# Patient Record
Sex: MALE | Race: WHITE | NOT HISPANIC OR LATINO
[De-identification: names, ages, dates, MRNs, and addresses within clinical notes are randomized per-mention and may not be internally consistent; named-entity substitution may affect disease eponyms.]

---

## 2021-07-14 ENCOUNTER — TRANSCRIPTION ENCOUNTER (OUTPATIENT)
Age: 31
End: 2021-07-14

## 2021-11-08 ENCOUNTER — APPOINTMENT (OUTPATIENT)
Dept: HUMAN REPRODUCTION | Facility: CLINIC | Age: 31
End: 2021-11-08
Payer: COMMERCIAL

## 2021-11-08 PROCEDURE — 36415 COLL VENOUS BLD VENIPUNCTURE: CPT

## 2023-08-13 ENCOUNTER — NON-APPOINTMENT (OUTPATIENT)
Age: 33
End: 2023-08-13

## 2023-08-17 ENCOUNTER — APPOINTMENT (OUTPATIENT)
Dept: ORTHOPEDIC SURGERY | Facility: CLINIC | Age: 33
End: 2023-08-17
Payer: COMMERCIAL

## 2023-08-17 VITALS — BODY MASS INDEX: 24.87 KG/M2 | HEIGHT: 75 IN | WEIGHT: 200 LBS

## 2023-08-17 PROBLEM — Z00.00 ENCOUNTER FOR PREVENTIVE HEALTH EXAMINATION: Status: ACTIVE | Noted: 2023-08-17

## 2023-08-17 PROCEDURE — 29280 STRAPPING OF HAND OR FINGER: CPT | Mod: RT

## 2023-08-17 PROCEDURE — 99203 OFFICE O/P NEW LOW 30 MIN: CPT | Mod: 25

## 2023-08-17 RX ORDER — ESCITALOPRAM OXALATE 5 MG/1
TABLET, FILM COATED ORAL
Refills: 0 | Status: ACTIVE | COMMUNITY

## 2023-08-17 NOTE — IMAGING
[de-identified] : right hand IF, MF, and RF skin intact no deformity swelling about the  PIP TTP about the PIPj FAROM except mild stiffness of the  PIP NVID

## 2023-08-17 NOTE — ASSESSMENT
[FreeTextEntry1] : The patient was advised of the diagnosis. The natural history of the pathology was explained in full to the patient in layman's terms. We discussed that the patient would be better off with early protected range of motion.  Splinting leads to a high incidence of stiffness.  I recommend jacky strapping(strap placed) and early range of motion exercises x 3weeks.  If motion is near normal the patient can continue with a home exercise program.  If the patient is having a lot of difficulty with self directed exercises, therapy may be recommended.  All questions were answered. The risks and benefits of surgical and non-surgical treatment alternatives were explained in full to the patient.  The affected finger is noted to be clean and dry.  A jacky loop was applied to the affected finger over the middle phalanx. It was then strapped to the adjacent finger in overlapping fashion. Fit and pressure were assessed as the strapping was applied and stopped when the desired amount of support was achieved  F/u in 3 weeks

## 2023-08-17 NOTE — DATA REVIEWED
[Outside X-rays] : outside x-rays [Right] : of the right [Hand] : hand [I independently reviewed and interpreted images and report] : I independently reviewed and interpreted images and report [FreeTextEntry1] : avulsions at PIP of IF, MF, and RF

## 2023-09-11 ENCOUNTER — APPOINTMENT (OUTPATIENT)
Dept: ORTHOPEDIC SURGERY | Facility: CLINIC | Age: 33
End: 2023-09-11
Payer: COMMERCIAL

## 2023-09-11 DIAGNOSIS — S63.632A SPRAIN OF INTERPHALANGEAL JOINT OF RIGHT MIDDLE FINGER, INITIAL ENCOUNTER: ICD-10-CM

## 2023-09-11 DIAGNOSIS — S63.634A SPRAIN OF INTERPHALANGEAL JOINT OF RIGHT RING FINGER, INITIAL ENCOUNTER: ICD-10-CM

## 2023-09-11 DIAGNOSIS — S63.630A SPRAIN OF INTERPHALANGEAL JOINT OF RIGHT INDEX FINGER, INITIAL ENCOUNTER: ICD-10-CM

## 2023-09-11 PROCEDURE — 73130 X-RAY EXAM OF HAND: CPT | Mod: RT

## 2023-09-11 PROCEDURE — 99213 OFFICE O/P EST LOW 20 MIN: CPT

## 2024-05-22 ENCOUNTER — NON-APPOINTMENT (OUTPATIENT)
Age: 34
End: 2024-05-22

## 2024-08-26 ENCOUNTER — NON-APPOINTMENT (OUTPATIENT)
Age: 34
End: 2024-08-26

## 2025-03-18 ENCOUNTER — NON-APPOINTMENT (OUTPATIENT)
Age: 35
End: 2025-03-18